# Patient Record
Sex: FEMALE | Race: OTHER | HISPANIC OR LATINO
[De-identification: names, ages, dates, MRNs, and addresses within clinical notes are randomized per-mention and may not be internally consistent; named-entity substitution may affect disease eponyms.]

---

## 2017-01-09 ENCOUNTER — RX RENEWAL (OUTPATIENT)
Age: 52
End: 2017-01-09

## 2017-01-12 ENCOUNTER — APPOINTMENT (OUTPATIENT)
Dept: FAMILY MEDICINE | Facility: CLINIC | Age: 52
End: 2017-01-12

## 2017-02-06 ENCOUNTER — RX RENEWAL (OUTPATIENT)
Age: 52
End: 2017-02-06

## 2017-03-06 ENCOUNTER — RX RENEWAL (OUTPATIENT)
Age: 52
End: 2017-03-06

## 2017-04-03 ENCOUNTER — RX RENEWAL (OUTPATIENT)
Age: 52
End: 2017-04-03

## 2017-04-04 ENCOUNTER — RX RENEWAL (OUTPATIENT)
Age: 52
End: 2017-04-04

## 2017-04-05 ENCOUNTER — RX RENEWAL (OUTPATIENT)
Age: 52
End: 2017-04-05

## 2017-04-11 ENCOUNTER — APPOINTMENT (OUTPATIENT)
Dept: FAMILY MEDICINE | Facility: CLINIC | Age: 52
End: 2017-04-11

## 2017-04-11 VITALS
OXYGEN SATURATION: 98 % | SYSTOLIC BLOOD PRESSURE: 148 MMHG | WEIGHT: 253 LBS | HEIGHT: 63 IN | BODY MASS INDEX: 44.83 KG/M2 | DIASTOLIC BLOOD PRESSURE: 90 MMHG | HEART RATE: 86 BPM | TEMPERATURE: 97.7 F

## 2017-04-11 DIAGNOSIS — R41.3 OTHER AMNESIA: ICD-10-CM

## 2017-04-11 LAB — HBA1C MFR BLD HPLC: 9.1

## 2017-04-12 ENCOUNTER — RECORD ABSTRACTING (OUTPATIENT)
Age: 52
End: 2017-04-12

## 2017-04-12 LAB
25(OH)D3 SERPL-MCNC: 26.4 NG/ML
ALBUMIN SERPL ELPH-MCNC: 4.1 G/DL
ALP BLD-CCNC: 68 U/L
ALT SERPL-CCNC: 31 U/L
ANION GAP SERPL CALC-SCNC: 14 MMOL/L
APPEARANCE: CLEAR
AST SERPL-CCNC: 20 U/L
BASOPHILS # BLD AUTO: 0.02 K/UL
BASOPHILS NFR BLD AUTO: 0.3 %
BILIRUB SERPL-MCNC: 0.3 MG/DL
BILIRUBIN URINE: NEGATIVE
BLOOD URINE: NEGATIVE
BUN SERPL-MCNC: 15 MG/DL
CALCIUM SERPL-MCNC: 9.8 MG/DL
CHLORIDE SERPL-SCNC: 96 MMOL/L
CHOLEST SERPL-MCNC: 132 MG/DL
CHOLEST/HDLC SERPL: 4.1 RATIO
CO2 SERPL-SCNC: 25 MMOL/L
COLOR: YELLOW
CREAT SERPL-MCNC: 0.79 MG/DL
EOSINOPHIL # BLD AUTO: 0.48 K/UL
EOSINOPHIL NFR BLD AUTO: 6.7 %
GLUCOSE QUALITATIVE U: NORMAL MG/DL
GLUCOSE SERPL-MCNC: 323 MG/DL
HCT VFR BLD CALC: 40.5 %
HDLC SERPL-MCNC: 32 MG/DL
HGB BLD-MCNC: 13.3 G/DL
IMM GRANULOCYTES NFR BLD AUTO: 0.4 %
KETONES URINE: NEGATIVE
LDLC SERPL CALC-MCNC: 69 MG/DL
LEUKOCYTE ESTERASE URINE: NEGATIVE
LYMPHOCYTES # BLD AUTO: 2.4 K/UL
LYMPHOCYTES NFR BLD AUTO: 33.6 %
MAN DIFF?: NORMAL
MCHC RBC-ENTMCNC: 27 PG
MCHC RBC-ENTMCNC: 32.8 GM/DL
MCV RBC AUTO: 82.3 FL
MONOCYTES # BLD AUTO: 0.52 K/UL
MONOCYTES NFR BLD AUTO: 7.3 %
NEUTROPHILS # BLD AUTO: 3.7 K/UL
NEUTROPHILS NFR BLD AUTO: 51.7 %
NITRITE URINE: NEGATIVE
PH URINE: 5
PLATELET # BLD AUTO: 257 K/UL
POTASSIUM SERPL-SCNC: 4.3 MMOL/L
PROT SERPL-MCNC: 7.8 G/DL
PROTEIN URINE: NEGATIVE MG/DL
RBC # BLD: 4.92 M/UL
RBC # FLD: 13.5 %
SODIUM SERPL-SCNC: 135 MMOL/L
SPECIFIC GRAVITY URINE: 1.01
TRIGL SERPL-MCNC: 157 MG/DL
TSH SERPL-ACNC: 1.81 UIU/ML
UROBILINOGEN URINE: NORMAL MG/DL
WBC # FLD AUTO: 7.15 K/UL

## 2017-05-03 ENCOUNTER — RX RENEWAL (OUTPATIENT)
Age: 52
End: 2017-05-03

## 2017-06-01 ENCOUNTER — RX RENEWAL (OUTPATIENT)
Age: 52
End: 2017-06-01

## 2017-07-03 ENCOUNTER — RX RENEWAL (OUTPATIENT)
Age: 52
End: 2017-07-03

## 2017-07-26 ENCOUNTER — RX RENEWAL (OUTPATIENT)
Age: 52
End: 2017-07-26

## 2017-07-31 ENCOUNTER — RX RENEWAL (OUTPATIENT)
Age: 52
End: 2017-07-31

## 2017-08-02 ENCOUNTER — RX RENEWAL (OUTPATIENT)
Age: 52
End: 2017-08-02

## 2017-08-10 ENCOUNTER — APPOINTMENT (OUTPATIENT)
Dept: FAMILY MEDICINE | Facility: CLINIC | Age: 52
End: 2017-08-10
Payer: MEDICAID

## 2017-08-10 ENCOUNTER — RESULT CHARGE (OUTPATIENT)
Age: 52
End: 2017-08-10

## 2017-08-10 VITALS
DIASTOLIC BLOOD PRESSURE: 77 MMHG | TEMPERATURE: 97.8 F | HEART RATE: 77 BPM | HEIGHT: 63 IN | SYSTOLIC BLOOD PRESSURE: 127 MMHG | OXYGEN SATURATION: 97 % | WEIGHT: 242 LBS | BODY MASS INDEX: 42.88 KG/M2

## 2017-08-10 DIAGNOSIS — E11.65 TYPE 2 DIABETES MELLITUS WITH HYPERGLYCEMIA: ICD-10-CM

## 2017-08-10 DIAGNOSIS — M25.552 PAIN IN LEFT HIP: ICD-10-CM

## 2017-08-10 LAB
GLUCOSE BLDC GLUCOMTR-MCNC: 151
HBA1C MFR BLD HPLC: 7.5

## 2017-08-10 PROCEDURE — 83036 HEMOGLOBIN GLYCOSYLATED A1C: CPT | Mod: QW

## 2017-08-10 PROCEDURE — 82962 GLUCOSE BLOOD TEST: CPT

## 2017-08-10 PROCEDURE — 99214 OFFICE O/P EST MOD 30 MIN: CPT | Mod: 25

## 2017-09-01 ENCOUNTER — RX RENEWAL (OUTPATIENT)
Age: 52
End: 2017-09-01

## 2017-09-28 ENCOUNTER — RX RENEWAL (OUTPATIENT)
Age: 52
End: 2017-09-28

## 2017-10-23 ENCOUNTER — RX RENEWAL (OUTPATIENT)
Age: 52
End: 2017-10-23

## 2017-10-30 ENCOUNTER — RX RENEWAL (OUTPATIENT)
Age: 52
End: 2017-10-30

## 2017-11-27 ENCOUNTER — RX RENEWAL (OUTPATIENT)
Age: 52
End: 2017-11-27

## 2017-12-27 ENCOUNTER — RX RENEWAL (OUTPATIENT)
Age: 52
End: 2017-12-27

## 2018-01-10 ENCOUNTER — RECORD ABSTRACTING (OUTPATIENT)
Age: 53
End: 2018-01-10

## 2018-01-22 ENCOUNTER — RX RENEWAL (OUTPATIENT)
Age: 53
End: 2018-01-22

## 2018-01-24 ENCOUNTER — RX RENEWAL (OUTPATIENT)
Age: 53
End: 2018-01-24

## 2018-01-25 ENCOUNTER — RECORD ABSTRACTING (OUTPATIENT)
Age: 53
End: 2018-01-25

## 2018-01-26 ENCOUNTER — RX RENEWAL (OUTPATIENT)
Age: 53
End: 2018-01-26

## 2018-01-31 ENCOUNTER — RX RENEWAL (OUTPATIENT)
Age: 53
End: 2018-01-31

## 2018-03-16 ENCOUNTER — RX RENEWAL (OUTPATIENT)
Age: 53
End: 2018-03-16

## 2018-03-16 ENCOUNTER — RECORD ABSTRACTING (OUTPATIENT)
Age: 53
End: 2018-03-16

## 2018-03-21 ENCOUNTER — RX RENEWAL (OUTPATIENT)
Age: 53
End: 2018-03-21

## 2018-03-22 ENCOUNTER — RX RENEWAL (OUTPATIENT)
Age: 53
End: 2018-03-22

## 2018-03-26 ENCOUNTER — RX RENEWAL (OUTPATIENT)
Age: 53
End: 2018-03-26

## 2018-04-18 ENCOUNTER — RX RENEWAL (OUTPATIENT)
Age: 53
End: 2018-04-18

## 2018-05-24 ENCOUNTER — RX RENEWAL (OUTPATIENT)
Age: 53
End: 2018-05-24

## 2018-06-18 ENCOUNTER — RX RENEWAL (OUTPATIENT)
Age: 53
End: 2018-06-18

## 2018-06-29 ENCOUNTER — RX RENEWAL (OUTPATIENT)
Age: 53
End: 2018-06-29

## 2018-07-23 ENCOUNTER — RX RENEWAL (OUTPATIENT)
Age: 53
End: 2018-07-23

## 2018-07-28 ENCOUNTER — RX RENEWAL (OUTPATIENT)
Age: 53
End: 2018-07-28

## 2018-08-03 ENCOUNTER — APPOINTMENT (OUTPATIENT)
Dept: FAMILY MEDICINE | Facility: CLINIC | Age: 53
End: 2018-08-03
Payer: MEDICAID

## 2018-08-03 VITALS
OXYGEN SATURATION: 97 % | DIASTOLIC BLOOD PRESSURE: 91 MMHG | WEIGHT: 228 LBS | HEART RATE: 78 BPM | BODY MASS INDEX: 40.4 KG/M2 | SYSTOLIC BLOOD PRESSURE: 171 MMHG | TEMPERATURE: 99.3 F | HEIGHT: 63 IN

## 2018-08-03 LAB
GLUCOSE BLDC GLUCOMTR-MCNC: 89
HBA1C MFR BLD HPLC: 6.1

## 2018-08-03 PROCEDURE — 83036 HEMOGLOBIN GLYCOSYLATED A1C: CPT | Mod: QW

## 2018-08-03 PROCEDURE — 82962 GLUCOSE BLOOD TEST: CPT

## 2018-08-03 PROCEDURE — 99214 OFFICE O/P EST MOD 30 MIN: CPT | Mod: 25

## 2018-08-03 PROCEDURE — 36415 COLL VENOUS BLD VENIPUNCTURE: CPT

## 2018-08-03 RX ORDER — CANAGLIFLOZIN 100 MG/1
100 TABLET, FILM COATED ORAL
Qty: 90 | Refills: 1 | Status: DISCONTINUED | COMMUNITY
Start: 2017-04-11 | End: 2018-08-03

## 2018-08-03 NOTE — HISTORY OF PRESENT ILLNESS
[FreeTextEntry1] : DM f/up\par meds refills. [de-identified] : 51 y/o F, w/ hx NIDDM, HTN, not seen in the office for almost 1 year presents today for f/up and meds refills.\par \par States has been checking glucose at home: 100's.\par Compliant w/ meds.\par \par States during this time was in [place] taking care of her father.

## 2018-08-03 NOTE — ASSESSMENT
[FreeTextEntry1] : HTN; Not well control. Increase ramipril to bid. \par \par CAD: on carvedilol, furosemide, spironolactone. F/up w/ cardiology. Last Echo, stress test: 2015.\par \par DM: HbA1c: 6.1%. Continue metformin 1000mg bid and glipizide 10mg daily.\par \par Ophthalmology referral.\par \par Mammo and colonoscopy on hold.\par \par

## 2018-08-06 LAB
ANION GAP SERPL CALC-SCNC: 12 MMOL/L
BUN SERPL-MCNC: 13 MG/DL
CALCIUM SERPL-MCNC: 9.5 MG/DL
CHLORIDE SERPL-SCNC: 103 MMOL/L
CO2 SERPL-SCNC: 27 MMOL/L
CREAT SERPL-MCNC: 0.83 MG/DL
CREAT SPEC-SCNC: 58 MG/DL
GLUCOSE SERPL-MCNC: 88 MG/DL
MICROALBUMIN 24H UR DL<=1MG/L-MCNC: 1.2 MG/DL
MICROALBUMIN/CREAT 24H UR-RTO: 21 MG/G
POTASSIUM SERPL-SCNC: 4.2 MMOL/L
SODIUM SERPL-SCNC: 142 MMOL/L

## 2018-08-08 ENCOUNTER — RX RENEWAL (OUTPATIENT)
Age: 53
End: 2018-08-08

## 2018-08-14 ENCOUNTER — RX RENEWAL (OUTPATIENT)
Age: 53
End: 2018-08-14

## 2018-09-29 ENCOUNTER — RX RENEWAL (OUTPATIENT)
Age: 53
End: 2018-09-29

## 2018-12-03 ENCOUNTER — RX RENEWAL (OUTPATIENT)
Age: 53
End: 2018-12-03

## 2018-12-05 ENCOUNTER — RX RENEWAL (OUTPATIENT)
Age: 53
End: 2018-12-05

## 2018-12-06 ENCOUNTER — APPOINTMENT (OUTPATIENT)
Dept: RADIOLOGY | Facility: CLINIC | Age: 53
End: 2018-12-06

## 2018-12-06 ENCOUNTER — OUTPATIENT (OUTPATIENT)
Dept: OUTPATIENT SERVICES | Facility: HOSPITAL | Age: 53
LOS: 1 days | End: 2018-12-06
Payer: COMMERCIAL

## 2018-12-06 DIAGNOSIS — G47.30 SLEEP APNEA, UNSPECIFIED: ICD-10-CM

## 2018-12-06 PROCEDURE — 71046 X-RAY EXAM CHEST 2 VIEWS: CPT

## 2018-12-06 PROCEDURE — 71046 X-RAY EXAM CHEST 2 VIEWS: CPT | Mod: 26

## 2018-12-12 ENCOUNTER — APPOINTMENT (OUTPATIENT)
Dept: FAMILY MEDICINE | Facility: CLINIC | Age: 53
End: 2018-12-12
Payer: MEDICAID

## 2018-12-12 ENCOUNTER — MED ADMIN CHARGE (OUTPATIENT)
Age: 53
End: 2018-12-12

## 2018-12-12 ENCOUNTER — LABORATORY RESULT (OUTPATIENT)
Age: 53
End: 2018-12-12

## 2018-12-12 VITALS
HEIGHT: 63 IN | OXYGEN SATURATION: 96 % | DIASTOLIC BLOOD PRESSURE: 94 MMHG | WEIGHT: 240 LBS | TEMPERATURE: 98 F | SYSTOLIC BLOOD PRESSURE: 144 MMHG | BODY MASS INDEX: 42.52 KG/M2 | HEART RATE: 73 BPM

## 2018-12-12 DIAGNOSIS — M25.562 PAIN IN LEFT KNEE: ICD-10-CM

## 2018-12-12 DIAGNOSIS — M25.511 PAIN IN RIGHT SHOULDER: ICD-10-CM

## 2018-12-12 DIAGNOSIS — G89.29 PAIN IN LEFT KNEE: ICD-10-CM

## 2018-12-12 LAB
GLUCOSE BLDC GLUCOMTR-MCNC: 156
HBA1C MFR BLD HPLC: 7.2

## 2018-12-12 PROCEDURE — 83036 HEMOGLOBIN GLYCOSYLATED A1C: CPT | Mod: QW

## 2018-12-12 PROCEDURE — 82962 GLUCOSE BLOOD TEST: CPT

## 2018-12-12 PROCEDURE — 99215 OFFICE O/P EST HI 40 MIN: CPT | Mod: 25

## 2018-12-12 PROCEDURE — G0008: CPT

## 2018-12-12 PROCEDURE — 90674 CCIIV4 VAC NO PRSV 0.5 ML IM: CPT

## 2018-12-12 PROCEDURE — 36415 COLL VENOUS BLD VENIPUNCTURE: CPT

## 2018-12-12 NOTE — REVIEW OF SYSTEMS
[Negative] : Heme/Lymph [Joint Pain] : joint pain [Muscle Weakness] : muscle weakness [FreeTextEntry9] : RT upper extremity numbness/ left knee pain

## 2018-12-12 NOTE — HISTORY OF PRESENT ILLNESS
[FreeTextEntry1] : DM f/up\par meds refills. [de-identified] : 51 y/o F, w/ hx NIDDM, HTN,cardiomyopathy.\par Seen recently by cardiology, Dr Adames. reports blood tests done last week at [facility] with cardio.\par \par Pt reports to be compliant with meds.\par Pt today reports pain in Rt arm with numbness and tingling radiated to RT hand. States symptoms are present for aprox 3 years. Now numbness is constant. Weakness in RT upper extremity.\par Symptoms where reported in the past and xray was order, never done.\par Pt also reports pain in Left knee for long time.\par States has been checking glucose at home: 150-179. Sometimes higher w/ starch.\par

## 2018-12-12 NOTE — ASSESSMENT
[FreeTextEntry1] : HTN;D/C ramipril to bid as per cardio.\par Now on valsartan 320mg \par \par CAD: on carvedilol, furosemide, spironolactone. F/up w/ cardiology. Last Echo, stress test: 2015.\par \par DM: HbA1c: 7.2%. Continue metformin 1000mg bid and glipizide 10mg daily.\par \par Ophthalmology referral.\par \par Mammo and colonoscopy on hold.: referrals today\par \par RT upper extremity weakness and numbness;\par -PT referral.\par -Xray shoulder and cervical.\par \par Lipid profile and CM\par \par Multiple allergies:\par Screening.\par \par Flu shot today

## 2018-12-13 LAB
ALBUMIN SERPL ELPH-MCNC: 4.3 G/DL
ALP BLD-CCNC: 63 U/L
ALT SERPL-CCNC: 40 U/L
ANION GAP SERPL CALC-SCNC: 12 MMOL/L
AST SERPL-CCNC: 34 U/L
BILIRUB SERPL-MCNC: 0.4 MG/DL
BUN SERPL-MCNC: 11 MG/DL
CALCIUM SERPL-MCNC: 9.4 MG/DL
CHLORIDE SERPL-SCNC: 100 MMOL/L
CHOLEST SERPL-MCNC: 150 MG/DL
CHOLEST/HDLC SERPL: 4.1 RATIO
CO2 SERPL-SCNC: 28 MMOL/L
CREAT SERPL-MCNC: 0.74 MG/DL
GLUCOSE SERPL-MCNC: 165 MG/DL
HDLC SERPL-MCNC: 37 MG/DL
LDLC SERPL CALC-MCNC: 92 MG/DL
POTASSIUM SERPL-SCNC: 4.5 MMOL/L
PROT SERPL-MCNC: 8.1 G/DL
SODIUM SERPL-SCNC: 140 MMOL/L
TRIGL SERPL-MCNC: 103 MG/DL
TSH SERPL-ACNC: 1.79 UIU/ML

## 2018-12-14 LAB
CREAT SPEC-SCNC: 69 MG/DL
MICROALBUMIN 24H UR DL<=1MG/L-MCNC: <1.2 MG/DL
MICROALBUMIN/CREAT 24H UR-RTO: NORMAL

## 2018-12-24 ENCOUNTER — RX RENEWAL (OUTPATIENT)
Age: 53
End: 2018-12-24

## 2018-12-24 LAB
A ALTERNATA IGE QN: <0.1 KUA/L
A FUMIGATUS IGE QN: <0.1 KUA/L
BERMUDA GRASS IGE QN: 0.11 KUA/L
BOXELDER IGE QN: 0.2 KUA/L
C HERBARUM IGE QN: <0.1 KUA/L
CALIF WALNUT IGE QN: 0.4 KUA/L
CAT DANDER IGE QN: 0.63 KUA/L
CLAM IGE QN: <0.1 KUA/L
CMN PIGWEED IGE QN: 0.26 KUA/L
CODFISH IGE QN: <0.1 KUA/L
COMMON RAGWEED IGE QN: 0.38 KUA/L
CORN IGE QN: 0.31 KUA/L
COTTONWOOD IGE QN: 0.13 KUA/L
COW MILK IGE QN: 0.49 KUA/L
D FARINAE IGE QN: 8.91 KUA/L
D PTERONYSS IGE QN: 7.52 KUA/L
DEPRECATED A ALTERNATA IGE RAST QL: 0
DEPRECATED A FUMIGATUS IGE RAST QL: 0
DEPRECATED BERMUDA GRASS IGE RAST QL: NORMAL
DEPRECATED BOXELDER IGE RAST QL: NORMAL
DEPRECATED C HERBARUM IGE RAST QL: 0
DEPRECATED CAT DANDER IGE RAST QL: ABNORMAL
DEPRECATED CLAM IGE RAST QL: 0
DEPRECATED CODFISH IGE RAST QL: 0
DEPRECATED COMMON PIGWEED IGE RAST QL: NORMAL
DEPRECATED COMMON RAGWEED IGE RAST QL: ABNORMAL
DEPRECATED CORN IGE RAST QL: NORMAL
DEPRECATED COTTONWOOD IGE RAST QL: NORMAL
DEPRECATED COW MILK IGE RAST QL: ABNORMAL
DEPRECATED D FARINAE IGE RAST QL: ABNORMAL
DEPRECATED D PTERONYSS IGE RAST QL: ABNORMAL
DEPRECATED DOG DANDER IGE RAST QL: ABNORMAL
DEPRECATED EGG WHITE IGE RAST QL: 0
DEPRECATED GOOSEFOOT IGE RAST QL: 0
DEPRECATED LONDON PLANE IGE RAST QL: NORMAL
DEPRECATED MUGWORT IGE RAST QL: 0
DEPRECATED P NOTATUM IGE RAST QL: ABNORMAL
DEPRECATED PEANUT IGE RAST QL: NORMAL
DEPRECATED RED CEDAR IGE RAST QL: NORMAL
DEPRECATED ROACH IGE RAST QL: ABNORMAL
DEPRECATED SCALLOP IGE RAST QL: <0.1 KUA/L
DEPRECATED SESAME SEED IGE RAST QL: NORMAL
DEPRECATED SHEEP SORREL IGE RAST QL: 0
DEPRECATED SHRIMP IGE RAST QL: ABNORMAL
DEPRECATED SILVER BIRCH IGE RAST QL: NORMAL
DEPRECATED SOYBEAN IGE RAST QL: 0
DEPRECATED TIMOTHY IGE RAST QL: 0
DEPRECATED WALNUT IGE RAST QL: NORMAL
DEPRECATED WHEAT IGE RAST QL: NORMAL
DEPRECATED WHITE ASH IGE RAST QL: NORMAL
DEPRECATED WHITE OAK IGE RAST QL: NORMAL
DOG DANDER IGE QN: 4.28 KUA/L
EGG WHITE IGE QN: <0.1 KUA/L
GOOSEFOOT IGE QN: <0.1 KUA/L
LONDON PLANE IGE QN: 0.16 KUA/L
MUGWORT IGE QN: <0.1 KUA/L
MULBERRY (T70) CLASS: 0
MULBERRY (T70) CONC: <0.1 KUA/L
P NOTATUM IGE QN: 0.36 KUA/L
PEANUT IGE QN: 0.14 KUA/L
RED CEDAR IGE QN: 0.16 KUA/L
ROACH IGE QN: 0.35 KUA/L
SCALLOP IGE QN: 0
SCALLOP IGE QN: 0.54 KUA/L
SESAME SEED IGE QN: 0.15 KUA/L
SHEEP SORREL IGE QN: <0.1 KUA/L
SILVER BIRCH IGE QN: 0.22 KUA/L
SOYBEAN IGE QN: <0.1 KUA/L
TIMOTHY IGE QN: <0.1 KUA/L
TREE ALLERG MIX1 IGE QL: ABNORMAL
WALNUT IGE QN: 0.14 KUA/L
WHEAT IGE QN: 0.21 KUA/L
WHITE ASH IGE QN: 0.11 KUA/L
WHITE ELM IGE QN: 0.49 KUA/L
WHITE ELM IGE QN: ABNORMAL
WHITE OAK IGE QN: 0.15 KUA/L

## 2019-01-02 ENCOUNTER — RX RENEWAL (OUTPATIENT)
Age: 54
End: 2019-01-02

## 2019-02-24 ENCOUNTER — RX RENEWAL (OUTPATIENT)
Age: 54
End: 2019-02-24

## 2019-03-04 PROBLEM — R41.3 MEMORY LOSS: Status: ACTIVE | Noted: 2017-04-11

## 2019-03-22 ENCOUNTER — RX RENEWAL (OUTPATIENT)
Age: 54
End: 2019-03-22

## 2019-04-03 ENCOUNTER — RX RENEWAL (OUTPATIENT)
Age: 54
End: 2019-04-03

## 2019-04-23 ENCOUNTER — RX RENEWAL (OUTPATIENT)
Age: 54
End: 2019-04-23

## 2019-05-17 ENCOUNTER — RX RENEWAL (OUTPATIENT)
Age: 54
End: 2019-05-17

## 2019-05-21 ENCOUNTER — RX RENEWAL (OUTPATIENT)
Age: 54
End: 2019-05-21

## 2019-06-17 ENCOUNTER — RX RENEWAL (OUTPATIENT)
Age: 54
End: 2019-06-17

## 2019-07-08 ENCOUNTER — RX RENEWAL (OUTPATIENT)
Age: 54
End: 2019-07-08

## 2019-07-16 ENCOUNTER — RX RENEWAL (OUTPATIENT)
Age: 54
End: 2019-07-16

## 2019-07-24 ENCOUNTER — RX RENEWAL (OUTPATIENT)
Age: 54
End: 2019-07-24

## 2019-08-15 ENCOUNTER — RX RENEWAL (OUTPATIENT)
Age: 54
End: 2019-08-15

## 2019-09-09 ENCOUNTER — APPOINTMENT (OUTPATIENT)
Dept: FAMILY MEDICINE | Facility: CLINIC | Age: 54
End: 2019-09-09
Payer: MEDICAID

## 2019-09-09 VITALS
SYSTOLIC BLOOD PRESSURE: 133 MMHG | BODY MASS INDEX: 40.4 KG/M2 | WEIGHT: 228 LBS | OXYGEN SATURATION: 97 % | TEMPERATURE: 98 F | HEART RATE: 74 BPM | DIASTOLIC BLOOD PRESSURE: 72 MMHG | HEIGHT: 63 IN

## 2019-09-09 DIAGNOSIS — Z00.00 ENCOUNTER FOR GENERAL ADULT MEDICAL EXAMINATION W/OUT ABNORMAL FINDINGS: ICD-10-CM

## 2019-09-09 LAB — HBA1C MFR BLD HPLC: 6.4

## 2019-09-09 PROCEDURE — 83036 HEMOGLOBIN GLYCOSYLATED A1C: CPT | Mod: QW

## 2019-09-09 PROCEDURE — 99396 PREV VISIT EST AGE 40-64: CPT | Mod: 25

## 2019-09-09 PROCEDURE — 36415 COLL VENOUS BLD VENIPUNCTURE: CPT

## 2019-09-09 RX ORDER — VALSARTAN 320 MG/1
320 TABLET, COATED ORAL DAILY
Qty: 90 | Refills: 1 | Status: DISCONTINUED | COMMUNITY
Start: 2018-12-12 | End: 2019-09-09

## 2019-09-09 NOTE — PHYSICAL EXAM
[No Acute Distress] : no acute distress [Well Nourished] : well nourished [Well Developed] : well developed [Well-Appearing] : well-appearing [Normal Sclera/Conjunctiva] : normal sclera/conjunctiva [EOMI] : extraocular movements intact [PERRL] : pupils equal round and reactive to light [Normal Outer Ear/Nose] : the outer ears and nose were normal in appearance [Normal Oropharynx] : the oropharynx was normal [No JVD] : no jugular venous distention [No Lymphadenopathy] : no lymphadenopathy [Supple] : supple [Thyroid Normal, No Nodules] : the thyroid was normal and there were no nodules present [No Respiratory Distress] : no respiratory distress  [No Accessory Muscle Use] : no accessory muscle use [Clear to Auscultation] : lungs were clear to auscultation bilaterally [Normal Rate] : normal rate  [Regular Rhythm] : with a regular rhythm [Normal S1, S2] : normal S1 and S2 [No Murmur] : no murmur heard [No Carotid Bruits] : no carotid bruits [No Abdominal Bruit] : a ~M bruit was not heard ~T in the abdomen [No Varicosities] : no varicosities [Pedal Pulses Present] : the pedal pulses are present [No Edema] : there was no peripheral edema [No Palpable Aorta] : no palpable aorta [No Extremity Clubbing/Cyanosis] : no extremity clubbing/cyanosis [Soft] : abdomen soft [Non Tender] : non-tender [Non-distended] : non-distended [No Masses] : no abdominal mass palpated [No HSM] : no HSM [Normal Bowel Sounds] : normal bowel sounds [Normal Anterior Cervical Nodes] : no anterior cervical lymphadenopathy [Normal Posterior Cervical Nodes] : no posterior cervical lymphadenopathy [No CVA Tenderness] : no CVA  tenderness [No Spinal Tenderness] : no spinal tenderness [No Joint Swelling] : no joint swelling [Grossly Normal Strength/Tone] : grossly normal strength/tone [No Rash] : no rash [Coordination Grossly Intact] : coordination grossly intact [No Focal Deficits] : no focal deficits [Deep Tendon Reflexes (DTR)] : deep tendon reflexes were 2+ and symmetric [Normal Gait] : normal gait [Normal Affect] : the affect was normal [Normal Insight/Judgement] : insight and judgment were intact

## 2019-09-09 NOTE — HEALTH RISK ASSESSMENT
[Fair] :  ~his/her~ mood as fair [No] : In the past 12 months have you used drugs other than those required for medical reasons? No [No falls in past year] : Patient reported no falls in the past year [1] : 2) Feeling down, depressed, or hopeless for several days (1) [None] : None [With Family] : lives with family [On disability] : on disability [Single] : single [# Of Children ___] : has [unfilled] children [Fully functional (bathing, dressing, toileting, transferring, walking, feeding)] : Fully functional (bathing, dressing, toileting, transferring, walking, feeding) [Independent] : managing finances [Full assistance needed] : using transportation [Smoke Detector] : smoke detector [Seat Belt] :  uses seat belt [With Patient/Caregiver] : With Patient/Caregiver [Designated Healthcare Proxy] : Designated healthcare proxy [Name: ___] : Health Care Proxy's Name: [unfilled]  [Relationship: ___] : Relationship: [unfilled] [Patient declined PAP Smear] : Patient declined PAP Smear [Patient declined colonoscopy] : Patient declined colonoscopy [HIV test declined] : HIV test declined [Hepatitis C test declined] : Hepatitis C test declined [] : No [de-identified] : no [de-identified] : no [Sexually Active] : not sexually active [Reports changes in hearing] : Reports no changes in hearing [Reports changes in dental health] : Reports no changes in dental health [Reports changes in vision] : Reports no changes in vision [MammogramDate] : ? [de-identified] : with father [BoneDensityComments] : N/A [de-identified] : s [AdvancecareDate] : 9/9/19

## 2019-09-09 NOTE — ASSESSMENT
[FreeTextEntry1] : 52 y/o F presents today for annual physical:\par \par HCM:\par -Blood and UA today\par -HIV/HC refused\par \par Mammo: referral\par \par Gyn: refused\par \par Colonoscopy: refused\par -FOBT kit with instructions provided\par \par HTN;\par - on valsartan 320mg (160mg x 2)\par \par CAD: \par -on carvedilol, furosemide, spironolactone. \par -F/up w/ cardiology. Last Echo 11/14/18: mild concentric left vent. Hypertrophy, EF: 65%:Cardiac PET scan,2015: normal.\par \par DM: HbA1c: 6.4%.\par - Continue metformin 1000mg bid and glipizide 10mg daily.\par -Ophthalmology referral.\par \par RT upper extremity weakness and numbness;/ neck pain/ Knee pain b/l\par -Xray shoulder and cervical.\par -Xray Knees\par \par \par \par Multiple allergies:\par Screening.: results d/w pt.\par \par \par \par

## 2019-09-09 NOTE — HISTORY OF PRESENT ILLNESS
[FreeTextEntry1] : Annual physical [de-identified] : 53 y/o F, w/ hx NIDDM, HTN,cardiomyopathy.\par Seen recently by cardiology, Dr Adames. [facility]par \par Pt reports to be compliant with meds. Not with f/up, due to transportation.\par Long hx knee , shoulder and neck pain. Xray's order not done , due to transportation .\par Concern about hair loss.

## 2019-09-09 NOTE — ADDENDUM
[FreeTextEntry1] : At the end of office visit, pt reports to MA ([place]), when applying for transportation, that she suffers from anxiety and has difficuty to be with people.\par Was was contacted by phone and return to the office to have a face to face conversation regrading her emotional instability.\par Pt reports she feels down, anxiety, fear to be with people for long time, since childhood.\par States her limitation to have regular office visit appointments is because her uncontrolled anxiety disorder.\par \par Counseling and Guidance was provided and d/w pt.\par Meds risks and benefits also d/w pt.\par Pt agrees to start sertraline 25 mg daily.\par Psychiatry and Mental health counseling referral.

## 2019-09-09 NOTE — PAST MEDICAL HISTORY
[Perimenopausal] : perimenopausal [Definite ___ (Date)] : the last menstrual period was [unfilled] [Total Preg ___] : G[unfilled]

## 2019-09-11 ENCOUNTER — RX RENEWAL (OUTPATIENT)
Age: 54
End: 2019-09-11

## 2019-09-12 LAB
25(OH)D3 SERPL-MCNC: 15.4 NG/ML
ALBUMIN SERPL ELPH-MCNC: 4.5 G/DL
ALP BLD-CCNC: 72 U/L
ALT SERPL-CCNC: 46 U/L
ANION GAP SERPL CALC-SCNC: 16 MMOL/L
APPEARANCE: CLEAR
AST SERPL-CCNC: 25 U/L
BASOPHILS # BLD AUTO: 0.05 K/UL
BASOPHILS NFR BLD AUTO: 0.7 %
BILIRUB SERPL-MCNC: 0.2 MG/DL
BILIRUBIN URINE: NEGATIVE
BLOOD URINE: NEGATIVE
BUN SERPL-MCNC: 27 MG/DL
CALCIUM SERPL-MCNC: 9.9 MG/DL
CHLORIDE SERPL-SCNC: 105 MMOL/L
CHOLEST SERPL-MCNC: 161 MG/DL
CHOLEST/HDLC SERPL: 4.2 RATIO
CO2 SERPL-SCNC: 22 MMOL/L
COLOR: COLORLESS
CREAT SERPL-MCNC: 1.12 MG/DL
CREAT SPEC-SCNC: 42 MG/DL
EOSINOPHIL # BLD AUTO: 0.4 K/UL
EOSINOPHIL NFR BLD AUTO: 5.7 %
GLUCOSE QUALITATIVE U: NEGATIVE
GLUCOSE SERPL-MCNC: 153 MG/DL
HCT VFR BLD CALC: 42.7 %
HDLC SERPL-MCNC: 38 MG/DL
HGB BLD-MCNC: 13.3 G/DL
IMM GRANULOCYTES NFR BLD AUTO: 0.4 %
KETONES URINE: NEGATIVE
LDLC SERPL CALC-MCNC: 101 MG/DL
LEUKOCYTE ESTERASE URINE: NEGATIVE
LYMPHOCYTES # BLD AUTO: 2.58 K/UL
LYMPHOCYTES NFR BLD AUTO: 36.6 %
MAN DIFF?: NORMAL
MCHC RBC-ENTMCNC: 26.7 PG
MCHC RBC-ENTMCNC: 31.1 GM/DL
MCV RBC AUTO: 85.7 FL
MICROALBUMIN 24H UR DL<=1MG/L-MCNC: <1.2 MG/DL
MICROALBUMIN/CREAT 24H UR-RTO: NORMAL MG/G
MONOCYTES # BLD AUTO: 0.42 K/UL
MONOCYTES NFR BLD AUTO: 6 %
NEUTROPHILS # BLD AUTO: 3.56 K/UL
NEUTROPHILS NFR BLD AUTO: 50.6 %
NITRITE URINE: NEGATIVE
PH URINE: 5
PLATELET # BLD AUTO: 292 K/UL
POTASSIUM SERPL-SCNC: 5.1 MMOL/L
PROT SERPL-MCNC: 7.8 G/DL
PROTEIN URINE: NEGATIVE
RBC # BLD: 4.98 M/UL
RBC # FLD: 13.7 %
SODIUM SERPL-SCNC: 142 MMOL/L
SPECIFIC GRAVITY URINE: 1.01
TRIGL SERPL-MCNC: 112 MG/DL
TSH SERPL-ACNC: 3.84 UIU/ML
UROBILINOGEN URINE: NORMAL
WBC # FLD AUTO: 7.04 K/UL

## 2019-09-13 ENCOUNTER — RX RENEWAL (OUTPATIENT)
Age: 54
End: 2019-09-13

## 2019-09-25 LAB — HEMOCCULT STL QL IA: NEGATIVE

## 2019-10-10 ENCOUNTER — RX RENEWAL (OUTPATIENT)
Age: 54
End: 2019-10-10

## 2019-10-14 ENCOUNTER — RX RENEWAL (OUTPATIENT)
Age: 54
End: 2019-10-14

## 2019-10-15 ENCOUNTER — APPOINTMENT (OUTPATIENT)
Dept: RADIOLOGY | Facility: CLINIC | Age: 54
End: 2019-10-15

## 2019-10-15 ENCOUNTER — APPOINTMENT (OUTPATIENT)
Dept: MAMMOGRAPHY | Facility: CLINIC | Age: 54
End: 2019-10-15

## 2019-11-06 ENCOUNTER — RX RENEWAL (OUTPATIENT)
Age: 54
End: 2019-11-06

## 2019-11-11 ENCOUNTER — MEDICATION RENEWAL (OUTPATIENT)
Age: 54
End: 2019-11-11

## 2019-12-02 ENCOUNTER — RX RENEWAL (OUTPATIENT)
Age: 54
End: 2019-12-02

## 2019-12-02 ENCOUNTER — OTHER (OUTPATIENT)
Age: 54
End: 2019-12-02

## 2019-12-04 ENCOUNTER — RX RENEWAL (OUTPATIENT)
Age: 54
End: 2019-12-04

## 2019-12-16 ENCOUNTER — APPOINTMENT (OUTPATIENT)
Dept: FAMILY MEDICINE | Facility: CLINIC | Age: 54
End: 2019-12-16
Payer: MEDICAID

## 2019-12-16 ENCOUNTER — MED ADMIN CHARGE (OUTPATIENT)
Age: 54
End: 2019-12-16

## 2019-12-16 VITALS
RESPIRATION RATE: 66 BRPM | HEIGHT: 63 IN | WEIGHT: 238 LBS | TEMPERATURE: 97.9 F | OXYGEN SATURATION: 98 % | SYSTOLIC BLOOD PRESSURE: 144 MMHG | DIASTOLIC BLOOD PRESSURE: 66 MMHG | BODY MASS INDEX: 42.17 KG/M2

## 2019-12-16 DIAGNOSIS — Z23 ENCOUNTER FOR IMMUNIZATION: ICD-10-CM

## 2019-12-16 LAB — HBA1C MFR BLD HPLC: 6.5

## 2019-12-16 PROCEDURE — 90674 CCIIV4 VAC NO PRSV 0.5 ML IM: CPT

## 2019-12-16 PROCEDURE — 83036 HEMOGLOBIN GLYCOSYLATED A1C: CPT | Mod: QW

## 2019-12-16 PROCEDURE — 90471 IMMUNIZATION ADMIN: CPT

## 2019-12-16 PROCEDURE — 36415 COLL VENOUS BLD VENIPUNCTURE: CPT

## 2019-12-16 PROCEDURE — 99214 OFFICE O/P EST MOD 30 MIN: CPT | Mod: 25

## 2019-12-16 NOTE — HEALTH RISK ASSESSMENT
[] : No [No] : In the past 12 months have you used drugs other than those required for medical reasons? No

## 2019-12-16 NOTE — HISTORY OF PRESENT ILLNESS
[FreeTextEntry1] : meds refills\par DM f/up\par flu shot [de-identified] : 53 y/o F, w/ hx NIDDM, HTN,cardiomyopathy.\par Seen by cardiology, Dr Adames. \par \par Pt reports to be compliant with meds. Not with f/up, due to transportation.\par Long hx knee , shoulder and neck pain. Xray's order not done , due to transportation.\par \par Pt started sertraline 25 mg in last visit, states did not notice any benefits.

## 2019-12-16 NOTE — PHYSICAL EXAM
[No Acute Distress] : no acute distress [Well Nourished] : well nourished [Well Developed] : well developed [Well-Appearing] : well-appearing [Normal Sclera/Conjunctiva] : normal sclera/conjunctiva [PERRL] : pupils equal round and reactive to light [EOMI] : extraocular movements intact [Normal Outer Ear/Nose] : the outer ears and nose were normal in appearance [Normal Oropharynx] : the oropharynx was normal [No JVD] : no jugular venous distention [No Lymphadenopathy] : no lymphadenopathy [Supple] : supple [Thyroid Normal, No Nodules] : the thyroid was normal and there were no nodules present [No Respiratory Distress] : no respiratory distress  [No Accessory Muscle Use] : no accessory muscle use [Clear to Auscultation] : lungs were clear to auscultation bilaterally [Normal Rate] : normal rate  [Regular Rhythm] : with a regular rhythm [No Murmur] : no murmur heard [Normal S1, S2] : normal S1 and S2 [No Carotid Bruits] : no carotid bruits [No Abdominal Bruit] : a ~M bruit was not heard ~T in the abdomen [No Varicosities] : no varicosities [Pedal Pulses Present] : the pedal pulses are present [No Edema] : there was no peripheral edema [No Palpable Aorta] : no palpable aorta [No Extremity Clubbing/Cyanosis] : no extremity clubbing/cyanosis [Soft] : abdomen soft [Non Tender] : non-tender [Non-distended] : non-distended [No Masses] : no abdominal mass palpated [No HSM] : no HSM [Normal Bowel Sounds] : normal bowel sounds [Normal Posterior Cervical Nodes] : no posterior cervical lymphadenopathy [No CVA Tenderness] : no CVA  tenderness [Normal Anterior Cervical Nodes] : no anterior cervical lymphadenopathy [No Spinal Tenderness] : no spinal tenderness [No Joint Swelling] : no joint swelling [Grossly Normal Strength/Tone] : grossly normal strength/tone [No Rash] : no rash [Coordination Grossly Intact] : coordination grossly intact [No Focal Deficits] : no focal deficits [Normal Gait] : normal gait [Normal Affect] : the affect was normal [Deep Tendon Reflexes (DTR)] : deep tendon reflexes were 2+ and symmetric [Normal Insight/Judgement] : insight and judgment were intact

## 2019-12-16 NOTE — ASSESSMENT
[FreeTextEntry1] : 55 y/o F obese, with DM, cardiomyopathy, CKD, \par \par HCM:\par -09/19\par -HIV/HC refused\par \par Mammo: referral/ pending\par \par Gyn: refused\par \par Colonoscopy: refused\par -FOBT : negative 2019.\par \par HTN;\par - on valsartan 320mg (160mg x 2)\par \par CAD: \par -on carvedilol, furosemide, spironolactone. \par -F/up w/ cardiology. Last Echo 11/14/18: mild concentric left vent. Hypertrophy, EF: 65%:Cardiac PET scan,2015: normal.\par \par DM: HbA1c: 6.5%.\par - Continue metformin 1000mg bid and glipizide 10mg daily.\par -Ophthalmology referral.Pending\par \par CKD:\par -bmp today\par \par Anxiety/ depression:\par -Increase sertraline to 50mg.\par \par RT upper extremity weakness and numbness;/ neck pain/ Knee pain b/l\par -Xray shoulder and cervical.> pending\par -Xray Knees> pending\par \par Flu shot today\par

## 2019-12-18 ENCOUNTER — RX RENEWAL (OUTPATIENT)
Age: 54
End: 2019-12-18

## 2019-12-18 LAB
ANION GAP SERPL CALC-SCNC: 13 MMOL/L
BUN SERPL-MCNC: 13 MG/DL
CALCIUM SERPL-MCNC: 9.3 MG/DL
CHLORIDE SERPL-SCNC: 102 MMOL/L
CO2 SERPL-SCNC: 25 MMOL/L
CREAT SERPL-MCNC: 0.78 MG/DL
GLUCOSE SERPL-MCNC: 129 MG/DL
POTASSIUM SERPL-SCNC: 4.1 MMOL/L
SODIUM SERPL-SCNC: 140 MMOL/L

## 2019-12-18 RX ORDER — VALSARTAN 160 MG/1
160 TABLET, COATED ORAL
Qty: 30 | Refills: 3 | Status: DISCONTINUED | COMMUNITY
Start: 2019-08-16 | End: 2019-12-18

## 2019-12-19 ENCOUNTER — APPOINTMENT (OUTPATIENT)
Dept: FAMILY MEDICINE | Facility: CLINIC | Age: 54
End: 2019-12-19

## 2020-03-20 ENCOUNTER — APPOINTMENT (OUTPATIENT)
Dept: FAMILY MEDICINE | Facility: CLINIC | Age: 55
End: 2020-03-20

## 2020-03-20 DIAGNOSIS — F41.9 ANXIETY DISORDER, UNSPECIFIED: ICD-10-CM

## 2020-04-08 ENCOUNTER — RX RENEWAL (OUTPATIENT)
Age: 55
End: 2020-04-08

## 2020-05-07 ENCOUNTER — TRANSCRIPTION ENCOUNTER (OUTPATIENT)
Age: 55
End: 2020-05-07

## 2020-05-07 ENCOUNTER — APPOINTMENT (OUTPATIENT)
Dept: FAMILY MEDICINE | Facility: CLINIC | Age: 55
End: 2020-05-07
Payer: MEDICAID

## 2020-05-07 PROCEDURE — 99442: CPT

## 2020-05-08 LAB
ANION GAP SERPL CALC-SCNC: 13 MMOL/L
APPEARANCE: CLEAR
BASOPHILS # BLD AUTO: 0.06 K/UL
BASOPHILS NFR BLD AUTO: 0.8 %
BILIRUBIN URINE: NEGATIVE
BLOOD URINE: NEGATIVE
BUN SERPL-MCNC: 15 MG/DL
CALCIUM SERPL-MCNC: 9.4 MG/DL
CHLORIDE SERPL-SCNC: 101 MMOL/L
CO2 SERPL-SCNC: 28 MMOL/L
COLOR: YELLOW
CREAT SERPL-MCNC: 0.86 MG/DL
EOSINOPHIL # BLD AUTO: 0.4 K/UL
EOSINOPHIL NFR BLD AUTO: 5.5 %
ESTIMATED AVERAGE GLUCOSE: 174 MG/DL
GLUCOSE QUALITATIVE U: NORMAL
GLUCOSE SERPL-MCNC: 189 MG/DL
HBA1C MFR BLD HPLC: 7.7 %
HCT VFR BLD CALC: 43.2 %
HGB BLD-MCNC: 13.3 G/DL
IMM GRANULOCYTES NFR BLD AUTO: 0.3 %
KETONES URINE: NEGATIVE
LEUKOCYTE ESTERASE URINE: NEGATIVE
LYMPHOCYTES # BLD AUTO: 2.67 K/UL
LYMPHOCYTES NFR BLD AUTO: 36.8 %
MAN DIFF?: NORMAL
MCHC RBC-ENTMCNC: 26.5 PG
MCHC RBC-ENTMCNC: 30.8 GM/DL
MCV RBC AUTO: 86.1 FL
MONOCYTES # BLD AUTO: 0.36 K/UL
MONOCYTES NFR BLD AUTO: 5 %
NEUTROPHILS # BLD AUTO: 3.74 K/UL
NEUTROPHILS NFR BLD AUTO: 51.6 %
NITRITE URINE: NEGATIVE
PH URINE: 6
PLATELET # BLD AUTO: 292 K/UL
POTASSIUM SERPL-SCNC: 4.3 MMOL/L
PROTEIN URINE: NORMAL
RBC # BLD: 5.02 M/UL
RBC # FLD: 13.3 %
SODIUM SERPL-SCNC: 141 MMOL/L
SPECIFIC GRAVITY URINE: 1.02
UROBILINOGEN URINE: NORMAL
WBC # FLD AUTO: 7.25 K/UL

## 2020-05-11 ENCOUNTER — RX RENEWAL (OUTPATIENT)
Age: 55
End: 2020-05-11

## 2020-05-27 RX ORDER — EMPAGLIFLOZIN 25 MG/1
25 TABLET, FILM COATED ORAL DAILY
Qty: 90 | Refills: 0 | Status: DISCONTINUED | COMMUNITY
Start: 2020-05-08 | End: 2020-05-27

## 2020-06-02 RX ORDER — DAPAGLIFLOZIN 10 MG/1
10 TABLET, FILM COATED ORAL
Qty: 90 | Refills: 1 | Status: DISCONTINUED | COMMUNITY
Start: 2020-05-27 | End: 2020-06-02

## 2020-06-03 ENCOUNTER — RX RENEWAL (OUTPATIENT)
Age: 55
End: 2020-06-03

## 2020-06-11 ENCOUNTER — RX RENEWAL (OUTPATIENT)
Age: 55
End: 2020-06-11

## 2020-06-24 RX ORDER — EMPAGLIFLOZIN 25 MG/1
25 TABLET, FILM COATED ORAL DAILY
Qty: 90 | Refills: 0 | Status: DISCONTINUED | COMMUNITY
Start: 2020-06-02 | End: 2020-06-24

## 2020-07-20 ENCOUNTER — RX RENEWAL (OUTPATIENT)
Age: 55
End: 2020-07-20

## 2020-08-14 ENCOUNTER — RX RENEWAL (OUTPATIENT)
Age: 55
End: 2020-08-14

## 2020-09-14 ENCOUNTER — RX RENEWAL (OUTPATIENT)
Age: 55
End: 2020-09-14

## 2020-10-07 ENCOUNTER — APPOINTMENT (OUTPATIENT)
Dept: FAMILY MEDICINE | Facility: CLINIC | Age: 55
End: 2020-10-07

## 2020-10-21 ENCOUNTER — RX RENEWAL (OUTPATIENT)
Age: 55
End: 2020-10-21

## 2020-11-11 ENCOUNTER — RX RENEWAL (OUTPATIENT)
Age: 55
End: 2020-11-11

## 2020-11-20 ENCOUNTER — RX RENEWAL (OUTPATIENT)
Age: 55
End: 2020-11-20

## 2020-12-09 ENCOUNTER — APPOINTMENT (OUTPATIENT)
Dept: FAMILY MEDICINE | Facility: CLINIC | Age: 55
End: 2020-12-09

## 2020-12-16 ENCOUNTER — NON-APPOINTMENT (OUTPATIENT)
Age: 55
End: 2020-12-16

## 2020-12-16 ENCOUNTER — RX RENEWAL (OUTPATIENT)
Age: 55
End: 2020-12-16

## 2020-12-27 ENCOUNTER — RX RENEWAL (OUTPATIENT)
Age: 55
End: 2020-12-27

## 2021-01-06 ENCOUNTER — NON-APPOINTMENT (OUTPATIENT)
Age: 56
End: 2021-01-06

## 2021-01-14 ENCOUNTER — RX RENEWAL (OUTPATIENT)
Age: 56
End: 2021-01-14

## 2021-02-12 ENCOUNTER — RX RENEWAL (OUTPATIENT)
Age: 56
End: 2021-02-12

## 2021-02-16 ENCOUNTER — RX RENEWAL (OUTPATIENT)
Age: 56
End: 2021-02-16

## 2021-02-22 ENCOUNTER — RX RENEWAL (OUTPATIENT)
Age: 56
End: 2021-02-22

## 2021-02-23 ENCOUNTER — RX RENEWAL (OUTPATIENT)
Age: 56
End: 2021-02-23

## 2021-03-03 ENCOUNTER — APPOINTMENT (OUTPATIENT)
Dept: FAMILY MEDICINE | Facility: CLINIC | Age: 56
End: 2021-03-03
Payer: MEDICAID

## 2021-03-03 VITALS
OXYGEN SATURATION: 98 % | BODY MASS INDEX: 41.99 KG/M2 | WEIGHT: 237 LBS | TEMPERATURE: 97.7 F | HEART RATE: 88 BPM | DIASTOLIC BLOOD PRESSURE: 74 MMHG | RESPIRATION RATE: 16 BRPM | SYSTOLIC BLOOD PRESSURE: 136 MMHG | HEIGHT: 63 IN

## 2021-03-03 DIAGNOSIS — Z13.31 ENCOUNTER FOR SCREENING FOR DEPRESSION: ICD-10-CM

## 2021-03-03 DIAGNOSIS — I10 ESSENTIAL (PRIMARY) HYPERTENSION: ICD-10-CM

## 2021-03-03 DIAGNOSIS — R20.0 ANESTHESIA OF SKIN: ICD-10-CM

## 2021-03-03 DIAGNOSIS — M25.551 PAIN IN RIGHT HIP: ICD-10-CM

## 2021-03-03 DIAGNOSIS — J30.2 OTHER SEASONAL ALLERGIC RHINITIS: ICD-10-CM

## 2021-03-03 LAB — HBA1C MFR BLD HPLC: 7.2

## 2021-03-03 PROCEDURE — 83036 HEMOGLOBIN GLYCOSYLATED A1C: CPT | Mod: QW

## 2021-03-03 PROCEDURE — 99072 ADDL SUPL MATRL&STAF TM PHE: CPT

## 2021-03-03 PROCEDURE — 99214 OFFICE O/P EST MOD 30 MIN: CPT | Mod: 25

## 2021-03-03 RX ORDER — SERTRALINE HYDROCHLORIDE 50 MG/1
50 TABLET, FILM COATED ORAL DAILY
Qty: 30 | Refills: 3 | Status: DISCONTINUED | COMMUNITY
Start: 2019-09-09 | End: 2021-03-03

## 2021-03-03 NOTE — ASSESSMENT
[FreeTextEntry1] : 55 y/o F obese, with DM, cardiomyopathy, CKD, \par \par HCM:\par -09/19\par -HIV/HC refused\par \par Mammo: referral/ pending\par \par Gyn: refused\par \par Colonoscopy: refused\par -FOBT : negative 2019.> new fit today\par \par HTN;\par - on Losartan\par \par CAD: \par -on carvedilol, furosemide, spironolactone. \par -F/up w/ cardiology. Last Echo 11/14/18: mild concentric left vent. Hypertrophy, EF: 65%:Cardiac PET scan,2015: normal.\par \par DM: HbA1c: 7.2%.\par - Continue metformin 1000mg bid and glipizide 10mg daily.\par -Ophthalmology referral.Pending\par \par CKD:\par -bmp today\par \par Anxiety/ depression:\par -Off sertraline to 50mg.\par -meds risks and benefits d/w pt.\par -Counseling advise\par -Start Effexor.\par -F/up in 4-6 weeks.\par \par RT upper extremity weakness and numbness;/ neck pain/ Knee pain b/l\par -Xray shoulder and cervical.> pending\par -Xray Knees> pending\par \par \par

## 2021-03-03 NOTE — HISTORY OF PRESENT ILLNESS
[FreeTextEntry1] : meds refills\par DM f/up\par  [de-identified] : 53 y/o F, w/ hx NIDDM on Glipizide, metformin and Invokana, HTN,cardiomyopathy on Losartan, Carvedilol, furosemide and espironolactone, depression, she was on sertraline and d/c due to no benefits.\par Seen by cardiology, Dr Adames. \par \par Pt reports to be compliant with meds. Not with f/up, due to transportation.\par

## 2021-03-03 NOTE — HEALTH RISK ASSESSMENT
[No] : In the past 12 months have you used drugs other than those required for medical reasons? No [2] : 2) Feeling down, depressed, or hopeless for more than half of the days (2) [] : No

## 2021-03-08 LAB
25(OH)D3 SERPL-MCNC: 46.6 NG/ML
ALBUMIN SERPL ELPH-MCNC: 5 G/DL
ALP BLD-CCNC: 73 U/L
ALT SERPL-CCNC: 64 U/L
ANION GAP SERPL CALC-SCNC: 16 MMOL/L
AST SERPL-CCNC: 36 U/L
BASOPHILS # BLD AUTO: 0.05 K/UL
BASOPHILS NFR BLD AUTO: 0.8 %
BILIRUB SERPL-MCNC: 0.4 MG/DL
BUN SERPL-MCNC: 27 MG/DL
CALCIUM SERPL-MCNC: 10.6 MG/DL
CHLORIDE SERPL-SCNC: 103 MMOL/L
CHOLEST SERPL-MCNC: 173 MG/DL
CO2 SERPL-SCNC: 23 MMOL/L
CREAT SERPL-MCNC: 1.05 MG/DL
CREAT SPEC-SCNC: 65 MG/DL
EOSINOPHIL # BLD AUTO: 0.39 K/UL
EOSINOPHIL NFR BLD AUTO: 6.4 %
GLUCOSE SERPL-MCNC: 179 MG/DL
HCT VFR BLD CALC: 47.6 %
HDLC SERPL-MCNC: 40 MG/DL
HGB BLD-MCNC: 14.2 G/DL
IMM GRANULOCYTES NFR BLD AUTO: 0.2 %
LDLC SERPL CALC-MCNC: 105 MG/DL
LYMPHOCYTES # BLD AUTO: 2.22 K/UL
LYMPHOCYTES NFR BLD AUTO: 36.5 %
MAN DIFF?: NORMAL
MCHC RBC-ENTMCNC: 26 PG
MCHC RBC-ENTMCNC: 29.8 GM/DL
MCV RBC AUTO: 87 FL
MICROALBUMIN 24H UR DL<=1MG/L-MCNC: <1.2 MG/DL
MICROALBUMIN/CREAT 24H UR-RTO: NORMAL MG/G
MONOCYTES # BLD AUTO: 0.3 K/UL
MONOCYTES NFR BLD AUTO: 4.9 %
NEUTROPHILS # BLD AUTO: 3.12 K/UL
NEUTROPHILS NFR BLD AUTO: 51.2 %
NONHDLC SERPL-MCNC: 133 MG/DL
PLATELET # BLD AUTO: 316 K/UL
POTASSIUM SERPL-SCNC: 4.6 MMOL/L
PROT SERPL-MCNC: 8.3 G/DL
RBC # BLD: 5.47 M/UL
RBC # FLD: 13.2 %
SODIUM SERPL-SCNC: 143 MMOL/L
TRIGL SERPL-MCNC: 141 MG/DL
TSH SERPL-ACNC: 2.4 UIU/ML
WBC # FLD AUTO: 6.09 K/UL

## 2021-06-02 ENCOUNTER — RX RENEWAL (OUTPATIENT)
Age: 56
End: 2021-06-02

## 2021-06-16 ENCOUNTER — RX RENEWAL (OUTPATIENT)
Age: 56
End: 2021-06-16

## 2021-07-07 ENCOUNTER — RX RENEWAL (OUTPATIENT)
Age: 56
End: 2021-07-07

## 2021-08-10 ENCOUNTER — RX RENEWAL (OUTPATIENT)
Age: 56
End: 2021-08-10

## 2021-08-30 ENCOUNTER — RX RENEWAL (OUTPATIENT)
Age: 56
End: 2021-08-30

## 2021-08-31 ENCOUNTER — RX RENEWAL (OUTPATIENT)
Age: 56
End: 2021-08-31

## 2021-09-28 ENCOUNTER — RX RENEWAL (OUTPATIENT)
Age: 56
End: 2021-09-28

## 2021-09-29 ENCOUNTER — RX RENEWAL (OUTPATIENT)
Age: 56
End: 2021-09-29

## 2021-10-12 ENCOUNTER — RX RENEWAL (OUTPATIENT)
Age: 56
End: 2021-10-12

## 2021-10-26 ENCOUNTER — RX RENEWAL (OUTPATIENT)
Age: 56
End: 2021-10-26

## 2021-10-27 ENCOUNTER — RX RENEWAL (OUTPATIENT)
Age: 56
End: 2021-10-27

## 2021-11-01 ENCOUNTER — RX RENEWAL (OUTPATIENT)
Age: 56
End: 2021-11-01

## 2021-11-24 ENCOUNTER — RX RENEWAL (OUTPATIENT)
Age: 56
End: 2021-11-24

## 2021-12-01 ENCOUNTER — RX RENEWAL (OUTPATIENT)
Age: 56
End: 2021-12-01

## 2021-12-30 ENCOUNTER — RX RENEWAL (OUTPATIENT)
Age: 56
End: 2021-12-30

## 2022-02-02 ENCOUNTER — RX RENEWAL (OUTPATIENT)
Age: 57
End: 2022-02-02

## 2022-02-25 ENCOUNTER — RX RENEWAL (OUTPATIENT)
Age: 57
End: 2022-02-25

## 2022-02-27 ENCOUNTER — RX RENEWAL (OUTPATIENT)
Age: 57
End: 2022-02-27

## 2022-05-23 ENCOUNTER — RX RENEWAL (OUTPATIENT)
Age: 57
End: 2022-05-23

## 2022-06-13 ENCOUNTER — APPOINTMENT (OUTPATIENT)
Dept: FAMILY MEDICINE | Facility: CLINIC | Age: 57
End: 2022-06-13
Payer: MEDICAID

## 2022-06-13 VITALS
BODY MASS INDEX: 42.35 KG/M2 | SYSTOLIC BLOOD PRESSURE: 118 MMHG | DIASTOLIC BLOOD PRESSURE: 80 MMHG | OXYGEN SATURATION: 98 % | RESPIRATION RATE: 16 BRPM | HEART RATE: 83 BPM | WEIGHT: 239 LBS | TEMPERATURE: 98 F | HEIGHT: 63 IN

## 2022-06-13 DIAGNOSIS — F41.8 OTHER SPECIFIED ANXIETY DISORDERS: ICD-10-CM

## 2022-06-13 DIAGNOSIS — E11.9 TYPE 2 DIABETES MELLITUS W/OUT COMPLICATIONS: ICD-10-CM

## 2022-06-13 DIAGNOSIS — I25.10 ATHEROSCLEROTIC HEART DISEASE OF NATIVE CORONARY ARTERY W/OUT ANGINA PECTORIS: ICD-10-CM

## 2022-06-13 DIAGNOSIS — N18.30 CHRONIC KIDNEY DISEASE, STAGE 3 UNSPECIFIED: ICD-10-CM

## 2022-06-13 LAB — HBA1C MFR BLD HPLC: 7.3

## 2022-06-13 PROCEDURE — 83036 HEMOGLOBIN GLYCOSYLATED A1C: CPT | Mod: QW

## 2022-06-13 PROCEDURE — 99214 OFFICE O/P EST MOD 30 MIN: CPT | Mod: 25

## 2022-06-13 RX ORDER — VENLAFAXINE HYDROCHLORIDE 37.5 MG/1
37.5 CAPSULE, EXTENDED RELEASE ORAL
Qty: 30 | Refills: 2 | Status: DISCONTINUED | COMMUNITY
Start: 2021-03-03 | End: 2022-06-13

## 2022-06-13 NOTE — HEALTH RISK ASSESSMENT
[No] : In the past 12 months have you used drugs other than those required for medical reasons? No [Never] : Never [Yes] : Yes [3] : 2) Feeling down, depressed, or hopeless for nearly every day (3) [de-identified] : rare [[id number]] : 6

## 2022-06-13 NOTE — REVIEW OF SYSTEMS
[Fatigue] : fatigue [Suicidal] : suicidal [Insomnia] : insomnia [Anxiety] : anxiety [Depression] : depression

## 2022-06-13 NOTE — ASSESSMENT
[FreeTextEntry1] : 57 y/o F obese, with DM, cardiomyopathy, CKD, severe Depression:\par \par Severe depression:\par -Not benefits with sertraline, Venlafaxine.\par -Out of meds since 02/2022.\par -Pt advise for psychiatry evaluation and admission:\par -Pt was given information about [facility].\par - program referral.\par \par HCM:\par -09/19\par -HIV/HC refused\par Mammo: referral/ pending\par Gyn: refused\par Colonoscopy: refused\par \par HTN;\par - on Losartan\par \par CAD: \par -on carvedilol, furosemide, spironolactone. \par -F/up w/ cardiology. Last Echo 11/14/18: mild concentric left vent. Hypertrophy, EF: 65%:Cardiac PET scan,2015: normal.\par \par DM: HbA1c: 7.2%.\par - Continue metformin 1000mg bid and glipizide 10mg daily. and Invokana\par -Ophthalmology referral.Pending\par \par CKD:\par -bmp today\par \par -Xray shoulder and cervical.> pending\par -Xray Knees> pending\par \par Complete blood drawn today\par \par \par

## 2022-06-13 NOTE — HISTORY OF PRESENT ILLNESS
[FreeTextEntry1] : meds refills\par DM f/up\par  [de-identified] : 55 y/o F, not seen in the office > 1 year.w/ hx NIDDM on Glipizide, metformin and Invokana, HTN,cardiomyopathy on Losartan, Carvedilol, furosemide and spironolactone, depression, she was on sertraline and d/c due to no benefits and recent venlafaxine which also did not get benefits.\par Not seen by cardiology, Dr Adames. in long time.\par \par Pt reports to feel depressed and down.\par Pt reports to be compliant with meds. Not with f/up, due to transportation.\par Pt on disability.\par Pt had 2 doses COVID vaccine pfizer.

## 2022-06-20 ENCOUNTER — TRANSCRIPTION ENCOUNTER (OUTPATIENT)
Age: 57
End: 2022-06-20

## 2022-06-22 LAB
ALBUMIN SERPL ELPH-MCNC: 5.4 G/DL
ALP BLD-CCNC: 71 U/L
ALT SERPL-CCNC: 76 U/L
ANION GAP SERPL CALC-SCNC: 13 MMOL/L
AST SERPL-CCNC: 40 U/L
BASOPHILS # BLD AUTO: 0.04 K/UL
BASOPHILS NFR BLD AUTO: 0.6 %
BILIRUB SERPL-MCNC: 0.4 MG/DL
BUN SERPL-MCNC: 27 MG/DL
CALCIUM SERPL-MCNC: 10.8 MG/DL
CHLORIDE SERPL-SCNC: 103 MMOL/L
CHOLEST SERPL-MCNC: 202 MG/DL
CO2 SERPL-SCNC: 26 MMOL/L
CREAT SERPL-MCNC: 0.92 MG/DL
CREAT SPEC-SCNC: 29 MG/DL
CREAT/PROT UR: 0.3 RATIO
EGFR: 74 ML/MIN/1.73M2
EOSINOPHIL # BLD AUTO: 0.31 K/UL
EOSINOPHIL NFR BLD AUTO: 4.4 %
GLUCOSE SERPL-MCNC: 198 MG/DL
HCT VFR BLD CALC: 51.1 %
HDLC SERPL-MCNC: 40 MG/DL
HGB BLD-MCNC: 15.2 G/DL
IMM GRANULOCYTES NFR BLD AUTO: 0.4 %
LDLC SERPL CALC-MCNC: 125 MG/DL
LYMPHOCYTES # BLD AUTO: 2.39 K/UL
LYMPHOCYTES NFR BLD AUTO: 34.3 %
MAN DIFF?: NORMAL
MCHC RBC-ENTMCNC: 26.7 PG
MCHC RBC-ENTMCNC: 29.7 GM/DL
MCV RBC AUTO: 89.6 FL
MONOCYTES # BLD AUTO: 0.42 K/UL
MONOCYTES NFR BLD AUTO: 6 %
NEUTROPHILS # BLD AUTO: 3.78 K/UL
NEUTROPHILS NFR BLD AUTO: 54.3 %
NONHDLC SERPL-MCNC: 162 MG/DL
PLATELET # BLD AUTO: 305 K/UL
POTASSIUM SERPL-SCNC: 4.7 MMOL/L
PROT SERPL-MCNC: 8.7 G/DL
PROT UR-MCNC: 9 MG/DL
RBC # BLD: 5.7 M/UL
RBC # FLD: 13.4 %
SODIUM SERPL-SCNC: 142 MMOL/L
TRIGL SERPL-MCNC: 189 MG/DL
TSH SERPL-ACNC: 1.37 UIU/ML
WBC # FLD AUTO: 6.97 K/UL

## 2022-07-25 ENCOUNTER — RX RENEWAL (OUTPATIENT)
Age: 57
End: 2022-07-25

## 2022-08-01 ENCOUNTER — NON-APPOINTMENT (OUTPATIENT)
Age: 57
End: 2022-08-01

## 2022-09-12 ENCOUNTER — APPOINTMENT (OUTPATIENT)
Dept: FAMILY MEDICINE | Facility: CLINIC | Age: 57
End: 2022-09-12

## 2022-09-20 ENCOUNTER — RX RENEWAL (OUTPATIENT)
Age: 57
End: 2022-09-20

## 2022-09-22 ENCOUNTER — RX RENEWAL (OUTPATIENT)
Age: 57
End: 2022-09-22

## 2022-10-13 PROBLEM — Z13.31 SCREENING FOR DEPRESSION: Status: ACTIVE | Noted: 2021-03-03

## 2022-10-24 ENCOUNTER — RX RENEWAL (OUTPATIENT)
Age: 57
End: 2022-10-24

## 2022-11-16 RX ORDER — CANAGLIFLOZIN 100 MG/1
100 TABLET, FILM COATED ORAL
Qty: 90 | Refills: 1 | Status: ACTIVE | COMMUNITY
Start: 2020-06-24 | End: 1900-01-01

## 2022-11-16 RX ORDER — BLOOD SUGAR DIAGNOSTIC
STRIP MISCELLANEOUS
Qty: 100 | Refills: 3 | Status: ACTIVE | COMMUNITY
Start: 2018-08-14 | End: 1900-01-01

## 2022-11-16 RX ORDER — LANCETS
EACH MISCELLANEOUS
Qty: 3 | Refills: 0 | Status: ACTIVE | COMMUNITY
Start: 2019-01-02 | End: 1900-01-01

## 2022-11-16 RX ORDER — GLIPIZIDE 10 MG/1
10 TABLET ORAL
Qty: 180 | Refills: 1 | Status: ACTIVE | COMMUNITY
Start: 2020-11-20 | End: 1900-01-01

## 2022-11-16 RX ORDER — LOSARTAN POTASSIUM 100 MG/1
100 TABLET, FILM COATED ORAL DAILY
Qty: 90 | Refills: 1 | Status: ACTIVE | COMMUNITY
Start: 2019-12-18 | End: 1900-01-01

## 2023-02-17 ENCOUNTER — RX RENEWAL (OUTPATIENT)
Age: 58
End: 2023-02-17

## 2023-05-04 ENCOUNTER — RX RENEWAL (OUTPATIENT)
Age: 58
End: 2023-05-04

## 2023-06-05 ENCOUNTER — RX RENEWAL (OUTPATIENT)
Age: 58
End: 2023-06-05

## 2023-09-05 ENCOUNTER — RX RENEWAL (OUTPATIENT)
Age: 58
End: 2023-09-05

## 2024-11-25 NOTE — PHYSICAL EXAM
VM left for patient to inform of providers note.     [No Acute Distress] : no acute distress [Well Nourished] : well nourished [Well Developed] : well developed [Well-Appearing] : well-appearing [Normal Sclera/Conjunctiva] : normal sclera/conjunctiva [PERRL] : pupils equal round and reactive to light [EOMI] : extraocular movements intact [Normal Outer Ear/Nose] : the outer ears and nose were normal in appearance [Normal Oropharynx] : the oropharynx was normal [No JVD] : no jugular venous distention [Supple] : supple [No Lymphadenopathy] : no lymphadenopathy [Thyroid Normal, No Nodules] : the thyroid was normal and there were no nodules present [No Respiratory Distress] : no respiratory distress  [Clear to Auscultation] : lungs were clear to auscultation bilaterally [No Accessory Muscle Use] : no accessory muscle use [Normal Rate] : normal rate  [Regular Rhythm] : with a regular rhythm [Normal S1, S2] : normal S1 and S2 [No Murmur] : no murmur heard [No Carotid Bruits] : no carotid bruits [No Abdominal Bruit] : a ~M bruit was not heard ~T in the abdomen [No Varicosities] : no varicosities [Pedal Pulses Present] : the pedal pulses are present [No Edema] : there was no peripheral edema [No Extremity Clubbing/Cyanosis] : no extremity clubbing/cyanosis [No Palpable Aorta] : no palpable aorta [Soft] : abdomen soft [Non Tender] : non-tender [Non-distended] : non-distended [No Masses] : no abdominal mass palpated [No HSM] : no HSM [Normal Bowel Sounds] : normal bowel sounds [Normal Posterior Cervical Nodes] : no posterior cervical lymphadenopathy [Normal Anterior Cervical Nodes] : no anterior cervical lymphadenopathy [No CVA Tenderness] : no CVA  tenderness [No Spinal Tenderness] : no spinal tenderness [No Joint Swelling] : no joint swelling [Grossly Normal Strength/Tone] : grossly normal strength/tone [No Rash] : no rash [Normal Gait] : normal gait [Coordination Grossly Intact] : coordination grossly intact [No Focal Deficits] : no focal deficits [Deep Tendon Reflexes (DTR)] : deep tendon reflexes were 2+ and symmetric [Normal Affect] : the affect was normal [Normal Insight/Judgement] : insight and judgment were intact